# Patient Record
Sex: FEMALE | URBAN - METROPOLITAN AREA
[De-identification: names, ages, dates, MRNs, and addresses within clinical notes are randomized per-mention and may not be internally consistent; named-entity substitution may affect disease eponyms.]

---

## 2024-07-15 ENCOUNTER — APPOINTMENT (OUTPATIENT)
Dept: URBAN - METROPOLITAN AREA CLINIC 205 | Age: 29
Setting detail: DERMATOLOGY
End: 2024-07-15

## 2024-07-15 DIAGNOSIS — Z41.9 ENCOUNTER FOR PROCEDURE FOR PURPOSES OTHER THAN REMEDYING HEALTH STATE, UNSPECIFIED: ICD-10-CM

## 2024-07-15 PROCEDURE — OTHER COSMETIC CONSULTATION: GENERAL: OTHER

## 2024-07-15 PROCEDURE — OTHER ADDITIONAL NOTES: OTHER

## 2024-07-15 NOTE — PROCEDURE: ADDITIONAL NOTES
Detail Level: Simple
Render Risk Assessment In Note?: no
Additional Notes: THE FOLLOWING PRODUCTS AND PROCEDURES ARE RECOMMENDED. Pt is interested in a chemical peel because of the significant scarring on her face. She had a  in  the room with her because she spoke no english at all. This wasnt quite clear but i believe she is on tretinoin that she possibly bought online somehow or maybe from the [facility] [place]. She was also on isotretinoin in the past, but was only in 40 mg for a short amount of time before she just stopped taking it. She wanted a chemical peel but I told her I would not be comfortable with doing that until her acne is under control. She states when she does get acne, she is constantly picking and squeezing from them and i told her gettimg a peel would just be a cycle for her. She may come in for an appt with a provider for treatment, she was unsure at the time of the appt.